# Patient Record
Sex: MALE | Race: BLACK OR AFRICAN AMERICAN | NOT HISPANIC OR LATINO
[De-identification: names, ages, dates, MRNs, and addresses within clinical notes are randomized per-mention and may not be internally consistent; named-entity substitution may affect disease eponyms.]

---

## 2017-10-08 PROBLEM — Z00.00 ENCOUNTER FOR PREVENTIVE HEALTH EXAMINATION: Status: ACTIVE | Noted: 2017-10-08

## 2017-11-07 ENCOUNTER — APPOINTMENT (OUTPATIENT)
Dept: UROLOGY | Facility: CLINIC | Age: 62
End: 2017-11-07

## 2018-11-27 ENCOUNTER — APPOINTMENT (OUTPATIENT)
Dept: NEPHROLOGY | Facility: CLINIC | Age: 63
End: 2018-11-27
Payer: COMMERCIAL

## 2018-11-27 VITALS — HEART RATE: 75 BPM | SYSTOLIC BLOOD PRESSURE: 125 MMHG | RESPIRATION RATE: 16 BRPM | DIASTOLIC BLOOD PRESSURE: 80 MMHG

## 2018-11-27 PROCEDURE — 99244 OFF/OP CNSLTJ NEW/EST MOD 40: CPT

## 2018-11-27 RX ORDER — RIVAROXABAN 20 MG/1
20 TABLET, FILM COATED ORAL DAILY
Qty: 30 | Refills: 0 | Status: ACTIVE | COMMUNITY
Start: 2018-11-27

## 2018-11-27 RX ORDER — ASPIRIN ENTERIC COATED TABLETS 81 MG 81 MG/1
81 TABLET, DELAYED RELEASE ORAL
Qty: 30 | Refills: 4 | Status: ACTIVE | COMMUNITY
Start: 2018-11-27

## 2019-04-02 ENCOUNTER — APPOINTMENT (OUTPATIENT)
Dept: NEPHROLOGY | Facility: CLINIC | Age: 64
End: 2019-04-02
Payer: COMMERCIAL

## 2019-04-02 VITALS — DIASTOLIC BLOOD PRESSURE: 82 MMHG | SYSTOLIC BLOOD PRESSURE: 130 MMHG

## 2019-04-02 VITALS — RESPIRATION RATE: 16 BRPM | SYSTOLIC BLOOD PRESSURE: 147 MMHG | DIASTOLIC BLOOD PRESSURE: 93 MMHG | HEART RATE: 81 BPM

## 2019-04-02 VITALS — DIASTOLIC BLOOD PRESSURE: 93 MMHG | SYSTOLIC BLOOD PRESSURE: 156 MMHG

## 2019-04-02 PROCEDURE — 99215 OFFICE O/P EST HI 40 MIN: CPT

## 2019-04-02 RX ORDER — METOPROLOL SUCCINATE 25 MG/1
25 TABLET, EXTENDED RELEASE ORAL DAILY
Qty: 16 | Refills: 6 | Status: ACTIVE | COMMUNITY
Start: 2019-04-02

## 2019-04-02 NOTE — ASSESSMENT
[FreeTextEntry1] : 64yo Ghanaian M with long standing HTN, prediabetes, CKD III here for f/u:\par \par # CKD III\par -  baseline Cr between 0844-7385 steady at 1.6-1.7. I reviewed march 25th labs w patient: ACR 114mcg/mg. Cr 1.96 up from 1.6 last year. Didn't get renal sono done yet - encouraged to do so. 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 \par  from oct labs, will recheck next visti. \par - egfr down to 41, if drops <40 advise decreasing xeralto to 15mg daily. Recheck in 8 weeks.\par 2016 labs reviewed Cr 1.6-1.7 so overall stable. Pt remembers hearing something about asymmetry in kidneys \par - serologic GN w/u negative aside from evidence of past Hep B infx s/p core Ab production. \par \par # HTN and Afib- well controlled.\par \par # Pre diabetes - HbA1C 5.9% Oct well controlled. repeat next visit\par \par RTC in 2 months for f/u

## 2019-04-02 NOTE — HISTORY OF PRESENT ILLNESS
[FreeTextEntry1] : 64yo Cypriot M with long standing HTN, prediabetes, CKD III here for f/u:\par \par PCP Dr. Alisha Kauffman\par Cardiologist: used to be Dr. TIFFANIE Anaya, doesn't know name of new one\par \par Getting light headedness often usually in afternoons while exerting himself or even walking. Denies spinning sensation, weakness, blurry vision or palpitations/chest pain. He then describes the light headedness as a feeling of occipital headache as well. Improves with drinking a lot of water. \par \par Some mild edema in R ankle occasionally. No Sob/orthopnea.\par Energy is improved, good appetite and stable wt\par Denies urinary sx\par Checks BP at home about twice weekly, 130/60-70s typically, HR 60-70s as well, never 50's. \par

## 2019-04-02 NOTE — PHYSICAL EXAM
[General Appearance - Alert] : alert [General Appearance - In No Acute Distress] : in no acute distress [Sclera] : the sclera and conjunctiva were normal [PERRL With Normal Accommodation] : pupils were equal in size, round, and reactive to light [Extraocular Movements] : extraocular movements were intact [Outer Ear] : the ears and nose were normal in appearance [Oropharynx] : the oropharynx was normal [Neck Appearance] : the appearance of the neck was normal [Jugular Venous Distention Increased] : there was no jugular-venous distention [Auscultation Breath Sounds / Voice Sounds] : lungs were clear to auscultation bilaterally [Murmurs] : no murmurs [Bowel Sounds] : normal bowel sounds [Abdomen Soft] : soft [Abdomen Tenderness] : non-tender [Abdomen Mass (___ Cm)] : no abdominal mass palpated [No CVA Tenderness] : no ~M costovertebral angle tenderness [No Spinal Tenderness] : no spinal tenderness [Abnormal Walk] : normal gait [Involuntary Movements] : no involuntary movements were seen [Musculoskeletal - Swelling] : no joint swelling seen [Skin Color & Pigmentation] : normal skin color and pigmentation [Skin Turgor] : normal skin turgor [] : no rash [Cranial Nerves] : cranial nerves 2-12 were intact [No Focal Deficits] : no focal deficits [Oriented To Time, Place, And Person] : oriented to person, place, and time [Impaired Insight] : insight and judgment were intact [Affect] : the affect was normal [FreeTextEntry1] : trace R ankle edema

## 2019-06-03 ENCOUNTER — FORM ENCOUNTER (OUTPATIENT)
Age: 64
End: 2019-06-03

## 2019-06-04 ENCOUNTER — OUTPATIENT (OUTPATIENT)
Dept: OUTPATIENT SERVICES | Facility: HOSPITAL | Age: 64
LOS: 1 days | End: 2019-06-04
Payer: COMMERCIAL

## 2019-06-04 ENCOUNTER — APPOINTMENT (OUTPATIENT)
Dept: ULTRASOUND IMAGING | Facility: HOSPITAL | Age: 64
End: 2019-06-04

## 2019-06-04 PROCEDURE — 76770 US EXAM ABDO BACK WALL COMP: CPT | Mod: 26,59

## 2019-06-04 PROCEDURE — 93976 VASCULAR STUDY: CPT

## 2019-06-04 PROCEDURE — 93976 VASCULAR STUDY: CPT | Mod: 26

## 2019-06-04 PROCEDURE — 76770 US EXAM ABDO BACK WALL COMP: CPT

## 2019-06-17 ENCOUNTER — CHART COPY (OUTPATIENT)
Age: 64
End: 2019-06-17

## 2019-06-18 ENCOUNTER — APPOINTMENT (OUTPATIENT)
Dept: NEPHROLOGY | Facility: CLINIC | Age: 64
End: 2019-06-18
Payer: COMMERCIAL

## 2019-06-18 VITALS — SYSTOLIC BLOOD PRESSURE: 136 MMHG | DIASTOLIC BLOOD PRESSURE: 78 MMHG | HEART RATE: 64 BPM | RESPIRATION RATE: 16 BRPM

## 2019-06-18 DIAGNOSIS — E78.5 HYPERLIPIDEMIA, UNSPECIFIED: ICD-10-CM

## 2019-06-18 PROCEDURE — 99215 OFFICE O/P EST HI 40 MIN: CPT

## 2019-06-18 NOTE — HISTORY OF PRESENT ILLNESS
[FreeTextEntry1] : 62yo [place] Islander M with long standing HTN, prediabetes, CKD III here for f/u:\par \par PCP Dr. Alisha Kauffman - next arsen sept but he's trying to get an earlier arsen \par Cardiologist: used to be Dr. TIFFANIE Anaya, doesn't currently have another cardiologist, told her office would call her\par \par Still getting light headedness often usually in afternoons while exerting himself or even walking. Denies spinning sensation, weakness, blurry vision or palpitations/chest pain. He then describes the light headedness as a feeling of occipital headache as well. Improves with drinking a lot of water. Similar to prior. \par Some mild edema in R ankle occasionally. Similar to prior. No Sob/orthopnea.\par Energy is good, good appetite and stable wt, feeling generally and strong.\par Denies urinary sx\par Checks BP at home about twice weekly, 130/60-70s typically\par \par No OTC medications\par

## 2019-06-18 NOTE — ASSESSMENT
[FreeTextEntry1] : 64yo Panamanian M with long standing HTN, prediabetes, CKD III here for f/u:\par \par # CKD III\par -  baseline Cr between 0961-4629 steady at 1.6-1.7. I reviewed march 25th labs w patient: ACR 114mcg/mg. Cr 1.96 up from 1.6 last year. \par - reviewed 6/19 sono report w pt, 8cm R mildly atrophic kidney and 11cm L with 7cm non septated simple cyst both with increased echogenicity consistent with CKD. Of note, was told by a nephrologist >10yrs ago of asymmetrical kidneys and CKD so this has been long standing. \par  from oct labs, will recheck today\par - egfr down to 41, if drops <40 advise decreasing xeralto to 15mg daily. \par - serologic GN w/u negative aside from evidence of past Hep B infx s/p core Ab production. \par \par # HTN and Afib- well controlled. Goal BP <130/80 consistently. HR was low last visit and complained of dizziness so asked him to take 12.5mg metoprolol instead of 25mg however hasn't noted difference in afternoon dizziness. Told to try taking metoprolol before bed to see if improves.Added chlorthalidone 25mg daily today for edema, trace L lung crackles on exam and bps slightly above goal. \par HLD finds statin harsh on his stomach so has been cutting in half - prescribed him 10mg as he requested so he doesn't have to cut them in half. Needs cardiology f/u\par \par # Pre diabetes - HbA1C 5.9% Oct well controlled. repeat today\par \par RTC in 4 months for f/u

## 2019-10-15 ENCOUNTER — APPOINTMENT (OUTPATIENT)
Dept: NEPHROLOGY | Facility: CLINIC | Age: 64
End: 2019-10-15
Payer: COMMERCIAL

## 2019-10-15 VITALS — DIASTOLIC BLOOD PRESSURE: 70 MMHG | SYSTOLIC BLOOD PRESSURE: 105 MMHG | RESPIRATION RATE: 16 BRPM | HEART RATE: 68 BPM

## 2019-10-15 PROCEDURE — 99215 OFFICE O/P EST HI 40 MIN: CPT

## 2019-10-15 NOTE — HISTORY OF PRESENT ILLNESS
[FreeTextEntry1] : 65yo Sammarinese M with long standing HTN, prediabetes, CKD III here for f/u:\par \par PCP Dr. Alisha Kauffman - next arsen sept but he's trying to get an earlier arsen \par Cardiologist: used to be Dr. TIFFANIE Anaya, doesn't currently have another cardiologist, told her office would call her\par \par Checking BP at home, usually 120s/80-90 , HR 60's-70s\par Notes R leg swelling for some time now, unchanged. No prior surgery or injury to the RLE. \par no HURST or orthopnea.\par Saw PCP and cardiologist since last visit, no changes made. Due for colonoscopy. \par Occasional light headedness started in the last month or two on standing\par \par No OTC medications\par

## 2019-10-15 NOTE — ASSESSMENT
[FreeTextEntry1] : 64yo Saudi Arabian M with long standing HTN, prediabetes, CKD III here for f/u:\par \par # CKD III\par -  baseline Cr between 5157-5590 steady at 1.6-1.7. March/19 Cr up to 1.96, reviewed Oct labs, Cr stable 1.8. Negligible albuminuria.  \par - reviewed 6/19 sono report w pt, 8cm R mildly atrophic kidney and 11cm L with 7cm non septated simple cyst both with increased echogenicity consistent with CKD. Of note, was told by a nephrologist >10yrs ago of asymmetrical kidneys and CKD so this has been long standing. \par - egfr 40s, if drops <40 advise decreasing xeralto to 15mg daily. \par - serologic GN w/u negative aside from evidence of past Hep B infx s/p core Ab production. \par \par # HTN and Afib- well controlled. Goal BP <130/80 consistently. Low today in office, asked him to check BP at home 3x in a row to ensure valid and to bring machine to his pcp next month to compare to their readings. BUN up to 33 from 31, lightheadedness occasionally, if bp at home <120/80 consistently, he's to take only 1/2 chlorthalidone daily (12.5mg). \par \par # Pre diabetes - HbA1C 6%% Oct well controlled\par \par RTC in 4 months for f/u

## 2019-10-15 NOTE — PHYSICAL EXAM
[General Appearance - Alert] : alert [Sclera] : the sclera and conjunctiva were normal [General Appearance - In No Acute Distress] : in no acute distress [PERRL With Normal Accommodation] : pupils were equal in size, round, and reactive to light [Extraocular Movements] : extraocular movements were intact [Outer Ear] : the ears and nose were normal in appearance [Oropharynx] : the oropharynx was normal [Jugular Venous Distention Increased] : there was no jugular-venous distention [Neck Appearance] : the appearance of the neck was normal [Auscultation Breath Sounds / Voice Sounds] : lungs were clear to auscultation bilaterally [Murmurs] : no murmurs [Bowel Sounds] : normal bowel sounds [Abdomen Soft] : soft [Abdomen Tenderness] : non-tender [No CVA Tenderness] : no ~M costovertebral angle tenderness [Abdomen Mass (___ Cm)] : no abdominal mass palpated [Abnormal Walk] : normal gait [No Spinal Tenderness] : no spinal tenderness [Musculoskeletal - Swelling] : no joint swelling seen [Involuntary Movements] : no involuntary movements were seen [Skin Turgor] : normal skin turgor [Skin Color & Pigmentation] : normal skin color and pigmentation [] : no rash [No Focal Deficits] : no focal deficits [Cranial Nerves] : cranial nerves 2-12 were intact [Impaired Insight] : insight and judgment were intact [Oriented To Time, Place, And Person] : oriented to person, place, and time [Affect] : the affect was normal [FreeTextEntry1] : trace R ankle edema

## 2020-02-04 ENCOUNTER — APPOINTMENT (OUTPATIENT)
Dept: NEPHROLOGY | Facility: CLINIC | Age: 65
End: 2020-02-04
Payer: COMMERCIAL

## 2020-02-04 VITALS — HEART RATE: 65 BPM | SYSTOLIC BLOOD PRESSURE: 124 MMHG | DIASTOLIC BLOOD PRESSURE: 80 MMHG | RESPIRATION RATE: 16 BRPM

## 2020-02-04 PROCEDURE — 99214 OFFICE O/P EST MOD 30 MIN: CPT

## 2020-02-04 RX ORDER — ERGOCALCIFEROL 1.25 MG/1
1.25 MG CAPSULE ORAL
Qty: 12 | Refills: 4 | Status: DISCONTINUED | COMMUNITY
Start: 2019-04-02 | End: 2020-02-04

## 2020-02-10 ENCOUNTER — APPOINTMENT (OUTPATIENT)
Dept: NEPHROLOGY | Facility: CLINIC | Age: 65
End: 2020-02-10
Payer: COMMERCIAL

## 2020-02-10 VITALS — HEART RATE: 71 BPM | DIASTOLIC BLOOD PRESSURE: 89 MMHG | SYSTOLIC BLOOD PRESSURE: 129 MMHG

## 2020-02-10 PROCEDURE — 93784 AMBL BP MNTR W/SOFTWARE: CPT

## 2020-02-10 NOTE — PROCEDURE
[FreeTextEntry1] : Placed ABPM on left arm. Gave instructions for device function and proper fit. sleep period: 10p-7a.\par initial /89\par

## 2020-03-16 NOTE — ASSESSMENT
[FreeTextEntry1] : 65yo Fijian M with long standing HTN, prediabetes, CKD III here for f/u:\par \par # CKD III\par -  baseline Cr between 2258-5988 steady at 1.6-1.7. March and Oct/19 Cr 1.96, 1.8, decreased thiazide to 12.5mg daily and reviewed Jan labs Cr back to baseline 1.6.  PCR 0.2g \par - reviewed 6/19 sono report w pt, 8cm R mildly atrophic kidney and 11cm L with 7cm non septated simple cyst both with increased echogenicity consistent with CKD. Of note, was told by a nephrologist >10yrs ago of asymmetrical kidneys and CKD so this has been long standing. \par - egfr 50s, if drops <40 advise decreasing xeralto to 15mg daily. \par - serologic GN w/u negative aside from evidence of past Hep B infx s/p core Ab production. \par \par # HTN and Afib- controlled here in office however at home he's often getting high diastolics, will return Monday for 24hr ABPM placement. Goal BP <130/80 consistently.\par \par # Pre diabetes - HbA1C 6%% Oct well controlled\par \par RTC in 6 months for f/u\par \par Addendum: reviewed 24hr ABPM results - at goal, high diastolics on home BP monitor not consistent on 24hr, likely user error/white coat HTN. Non-dipper. No change in management.

## 2020-03-16 NOTE — HISTORY OF PRESENT ILLNESS
[FreeTextEntry1] : 65yo Micronesian M with long standing HTN, prediabetes, CKD III here for f/u:\par \par PCP Dr. Alisha Kauffman - next arsen sept but he's trying to get an earlier arsen \par Cardiologist: new, Dr Aliyah Lockhart, saw recently, no changes made\par \par Doing well, had a cold in January sx resolved now. \par BP 130s/ at home lately. \par Notes R leg swelling for some time now, unchanged. No prior surgery or injury to the RLE. \par no HURST or orthopnea.\par No further light headedness\par No urinary sx\par No OTC medications\par

## 2020-03-16 NOTE — PHYSICAL EXAM
[General Appearance - In No Acute Distress] : in no acute distress [General Appearance - Alert] : alert [PERRL With Normal Accommodation] : pupils were equal in size, round, and reactive to light [Sclera] : the sclera and conjunctiva were normal [Extraocular Movements] : extraocular movements were intact [Outer Ear] : the ears and nose were normal in appearance [Oropharynx] : the oropharynx was normal [Neck Appearance] : the appearance of the neck was normal [Jugular Venous Distention Increased] : there was no jugular-venous distention [Murmurs] : no murmurs [Auscultation Breath Sounds / Voice Sounds] : lungs were clear to auscultation bilaterally [Bowel Sounds] : normal bowel sounds [Abdomen Tenderness] : non-tender [Abdomen Soft] : soft [Abdomen Mass (___ Cm)] : no abdominal mass palpated [No CVA Tenderness] : no ~M costovertebral angle tenderness [Musculoskeletal - Swelling] : no joint swelling seen [Involuntary Movements] : no involuntary movements were seen [No Spinal Tenderness] : no spinal tenderness [Abnormal Walk] : normal gait [] : no rash [Skin Color & Pigmentation] : normal skin color and pigmentation [Skin Turgor] : normal skin turgor [Oriented To Time, Place, And Person] : oriented to person, place, and time [Cranial Nerves] : cranial nerves 2-12 were intact [No Focal Deficits] : no focal deficits [Affect] : the affect was normal [Impaired Insight] : insight and judgment were intact [FreeTextEntry1] : trace R ankle edema

## 2020-06-09 ENCOUNTER — APPOINTMENT (OUTPATIENT)
Dept: NEPHROLOGY | Facility: CLINIC | Age: 65
End: 2020-06-09
Payer: COMMERCIAL

## 2020-06-09 VITALS
WEIGHT: 163 LBS | HEIGHT: 62 IN | SYSTOLIC BLOOD PRESSURE: 126 MMHG | DIASTOLIC BLOOD PRESSURE: 88 MMHG | HEART RATE: 72 BPM | BODY MASS INDEX: 30 KG/M2

## 2020-06-09 DIAGNOSIS — Z78.9 OTHER SPECIFIED HEALTH STATUS: ICD-10-CM

## 2020-06-09 PROCEDURE — 99214 OFFICE O/P EST MOD 30 MIN: CPT

## 2020-06-09 NOTE — HISTORY OF PRESENT ILLNESS
[FreeTextEntry1] : 64-year-old man with a longstanding history of hypertension, CKD 3, prediabetes, nonnephrotic proteinuria, atrial fibrillation.  His creatinine was stable at 1.6-1.7 from 8978-6580, then increased to the range of 1.8-2.0, but is now down to 1.5!  He checks his home BP daily and runs 125-130/88-90.  He is faithfully taking amlodipine 5 mg daily chlorthalidone 25 mg daily and metoprolol 25 mg daily.  He has no edema, and no history of gout.  He continues to work as a  at [employer].

## 2020-06-09 NOTE — PHYSICAL EXAM
[General Appearance - In No Acute Distress] : in no acute distress [General Appearance - Alert] : alert [Sclera] : the sclera and conjunctiva were normal [Outer Ear] : the ears and nose were normal in appearance [PERRL With Normal Accommodation] : pupils were equal in size, round, and reactive to light [Neck Cervical Mass (___cm)] : no neck mass was observed [Neck Appearance] : the appearance of the neck was normal [Jugular Venous Distention Increased] : there was no jugular-venous distention [Heart Rate And Rhythm] : heart rate was normal and rhythm regular [Auscultation Breath Sounds / Voice Sounds] : lungs were clear to auscultation bilaterally [Heart Sounds] : normal S1 and S2 [Heart Sounds Gallop] : no gallops [Full Pulse] : the pedal pulses are present [Heart Sounds Pericardial Friction Rub] : no pericardial rub [Murmurs] : no murmurs [Edema] : there was no peripheral edema [No CVA Tenderness] : no ~M costovertebral angle tenderness [No Spinal Tenderness] : no spinal tenderness [Nail Clubbing] : no clubbing  or cyanosis of the fingernails [Abnormal Walk] : normal gait [Motor Tone] : muscle strength and tone were normal [Musculoskeletal - Swelling] : no joint swelling seen [Skin Turgor] : normal skin turgor [Skin Color & Pigmentation] : normal skin color and pigmentation [] : no rash [Sensation] : the sensory exam was normal to light touch and pinprick [Deep Tendon Reflexes (DTR)] : deep tendon reflexes were 2+ and symmetric [No Focal Deficits] : no focal deficits [Oriented To Time, Place, And Person] : oriented to person, place, and time [Impaired Insight] : insight and judgment were intact [Affect] : the affect was normal

## 2020-06-09 NOTE — ASSESSMENT
[FreeTextEntry1] : 64-year-old man with stable CKD 3, adequate BP control, tolerating antihypertensive meds well, avoiding NSAIDs.  We will continue current regimen and see Dr. Dye in 4 months.  He was given labs to be done a week before the next visit

## 2020-07-29 ENCOUNTER — RX RENEWAL (OUTPATIENT)
Age: 65
End: 2020-07-29

## 2020-07-29 RX ORDER — CHLORTHALIDONE 25 MG/1
25 TABLET ORAL DAILY
Qty: 90 | Refills: 3 | Status: ACTIVE | COMMUNITY
Start: 2019-06-18 | End: 1900-01-01

## 2020-08-11 ENCOUNTER — APPOINTMENT (OUTPATIENT)
Dept: HEART AND VASCULAR | Facility: CLINIC | Age: 65
End: 2020-08-11

## 2020-09-15 ENCOUNTER — APPOINTMENT (OUTPATIENT)
Dept: NEPHROLOGY | Facility: CLINIC | Age: 65
End: 2020-09-15
Payer: COMMERCIAL

## 2020-09-15 VITALS — HEART RATE: 68 BPM | SYSTOLIC BLOOD PRESSURE: 136 MMHG | RESPIRATION RATE: 16 BRPM | DIASTOLIC BLOOD PRESSURE: 77 MMHG

## 2020-09-15 VITALS — SYSTOLIC BLOOD PRESSURE: 121 MMHG | DIASTOLIC BLOOD PRESSURE: 74 MMHG

## 2020-09-15 PROCEDURE — 99214 OFFICE O/P EST MOD 30 MIN: CPT

## 2020-09-15 RX ORDER — ROSUVASTATIN CALCIUM 20 MG/1
20 TABLET, FILM COATED ORAL
Qty: 90 | Refills: 0 | Status: ACTIVE | COMMUNITY
Start: 2019-04-02

## 2020-09-15 NOTE — HISTORY OF PRESENT ILLNESS
[FreeTextEntry1] : 64yo Vietnamese M with Afib on AC, long standing HTN, prediabetes, CKD III here for f/u:\par \par PCP Dr. Alisha Kauffman - next arsen sept but he's trying to get an earlier arsen \par Cardiologist: Dr Aliyah Lockhart\par \par Doing well since last visit with my colleagues in Feb and March of this year. \par Had a L knee injury a few months ago, went away with 5d of meloxicam. \par Saw cardiologist for cardiac MRI, told it was "ok" but needs to see Dr. Lockhart to further discuss results. He was told of an issue with needing to "reset" the heart, ?arrhythmia\par Chronic mild R leg swelling, stable compared to prior. Worse when sitting, no pain on walking. No prior surgery or injury to the RLE. \par \par

## 2020-09-15 NOTE — PHYSICAL EXAM
[General Appearance - Alert] : alert [Sclera] : the sclera and conjunctiva were normal [PERRL With Normal Accommodation] : pupils were equal in size, round, and reactive to light [General Appearance - In No Acute Distress] : in no acute distress [Extraocular Movements] : extraocular movements were intact [Outer Ear] : the ears and nose were normal in appearance [Neck Appearance] : the appearance of the neck was normal [Oropharynx] : the oropharynx was normal [Jugular Venous Distention Increased] : there was no jugular-venous distention [Auscultation Breath Sounds / Voice Sounds] : lungs were clear to auscultation bilaterally [Murmurs] : no murmurs [Bowel Sounds] : normal bowel sounds [Abdomen Soft] : soft [Abdomen Tenderness] : non-tender [Abdomen Mass (___ Cm)] : no abdominal mass palpated [No CVA Tenderness] : no ~M costovertebral angle tenderness [No Spinal Tenderness] : no spinal tenderness [Abnormal Walk] : normal gait [Involuntary Movements] : no involuntary movements were seen [Musculoskeletal - Swelling] : no joint swelling seen [Skin Turgor] : normal skin turgor [] : no rash [Skin Color & Pigmentation] : normal skin color and pigmentation [Cranial Nerves] : cranial nerves 2-12 were intact [No Focal Deficits] : no focal deficits [Oriented To Time, Place, And Person] : oriented to person, place, and time [Affect] : the affect was normal [Impaired Insight] : insight and judgment were intact [FreeTextEntry1] : trace R ankle edema

## 2020-09-15 NOTE — ASSESSMENT
[FreeTextEntry1] : 66yo Urugu[place] M with Afib on AC, long standing HTN, prediabetes, CKD III here for f/u:\par \par # CKD III\par -  baseline Cr between 3453-1015 steady at 1.6-1.7. Reviewed Aug labs - Cr stable 1.54. Ernest u/a \par - 6/19 sono 8cm R mildly atrophic kidney and 11cm L with 7cm non septated simple cyst both with increased echogenicity consistent with CKD. Of note, was told by a nephrologist >10yrs ago of asymmetrical kidneys and CKD so this has been long standing. \par - egfr 50s, if drops <40 advise decreasing xeralto to 15mg daily. \par - serologic GN w/u negative aside from evidence of past Hep B infx s/p core Ab production. \par \par # HTN and Afib- controlled here in office however at home he's still often getting high diastolics. Last year we'd done 24hr ABPM results showing his BP was at goal including diastolics. Non-dipper. Asked him to bring his BP monitor in to his upcoming arsen with Dr Lockhart so he can check it in front of him to ensure proper technique and compare results to office BP machine. \par \par # Pre diabetes - well controlled\par \par RTC in 6 months for f/u\par \par

## 2020-09-22 ENCOUNTER — APPOINTMENT (OUTPATIENT)
Dept: GASTROENTEROLOGY | Facility: CLINIC | Age: 65
End: 2020-09-22
Payer: COMMERCIAL

## 2020-09-22 VITALS
RESPIRATION RATE: 16 BRPM | WEIGHT: 161.6 LBS | HEIGHT: 62 IN | OXYGEN SATURATION: 97 % | SYSTOLIC BLOOD PRESSURE: 120 MMHG | DIASTOLIC BLOOD PRESSURE: 80 MMHG | HEART RATE: 60 BPM | TEMPERATURE: 97.6 F | BODY MASS INDEX: 29.74 KG/M2

## 2020-09-22 DIAGNOSIS — Z12.12 ENCOUNTER FOR SCREENING FOR MALIGNANT NEOPLASM OF COLON: ICD-10-CM

## 2020-09-22 DIAGNOSIS — Z12.11 ENCOUNTER FOR SCREENING FOR MALIGNANT NEOPLASM OF COLON: ICD-10-CM

## 2020-09-22 PROCEDURE — 99203 OFFICE O/P NEW LOW 30 MIN: CPT

## 2020-09-22 NOTE — ASSESSMENT
[FreeTextEntry1] : 66yo M presenting for surveillance colonoscopy\par \par 1. Colorectal cancer screening - Patient average risk for CRC without family history. Initial screening colon unremarkable at age 49, repeat around age 59 with polyp and instructed to follow-up in 5 years.\par - Obtain routine labs\par - Obtain records from [facility]\par - Instructed patient to obtain cardiac evaluation/clearance for procedure and anesthesia given cardiac comorbidities - request EKG, ECHO, MRI findings\par - Patient will need to be off of Xarelto for 3 days prior to procedure given renal function\par - Discussed r/b/a/i of colonoscopic evaluation and patient amenable\par - Discussed need for bowel prep, COVID swab, and escort\par - Given renal fx, will plan for GoLytely bowel prep\par \par RTC as needed\par Patient seen and discussed with attending physician

## 2020-09-22 NOTE — PHYSICAL EXAM
[General Appearance - Alert] : alert [General Appearance - In No Acute Distress] : in no acute distress [General Appearance - Well Nourished] : well nourished [General Appearance - Well Developed] : well developed [General Appearance - Well-Appearing] : healthy appearing [PERRL With Normal Accommodation] : pupils were equal in size, round, and reactive to light [Extraocular Movements] : extraocular movements were intact [Outer Ear] : the ears and nose were normal in appearance [Hearing Threshold Finger Rub Not [place]] : hearing was normal [Neck Cervical Mass (___cm)] : no neck mass was observed [Respiration, Rhythm And Depth] : normal respiratory rhythm and effort [Exaggerated Use Of Accessory Muscles For Inspiration] : no accessory muscle use [Heart Rate And Rhythm] : heart rate was normal and rhythm regular [Edema] : there was no peripheral edema [Veins - Varicosity Changes] : there were no varicosital changes [Bowel Sounds] : normal bowel sounds [Abdomen Soft] : soft [Involuntary Movements] : no involuntary movements were seen [Skin Color & Pigmentation] : normal skin color and pigmentation [Skin Turgor] : normal skin turgor [] : no rash [Skin Lesions] : no skin lesions [No Focal Deficits] : no focal deficits [Oriented To Time, Place, And Person] : oriented to person, place, and time [Impaired Insight] : insight and judgment were intact [Affect] : the affect was normal [Mood] : the mood was normal

## 2020-09-22 NOTE — PHYSICAL EXAM
[General Appearance - Alert] : alert [General Appearance - In No Acute Distress] : in no acute distress [General Appearance - Well Nourished] : well nourished [General Appearance - Well Developed] : well developed [General Appearance - Well-Appearing] : healthy appearing [PERRL With Normal Accommodation] : pupils were equal in size, round, and reactive to light [Extraocular Movements] : extraocular movements were intact [Outer Ear] : the ears and nose were normal in appearance [Hearing Threshold Finger Rub Not [place]] : hearing was normal [Neck Cervical Mass (___cm)] : no neck mass was observed [Respiration, Rhythm And Depth] : normal respiratory rhythm and effort [Exaggerated Use Of Accessory Muscles For Inspiration] : no accessory muscle use [Heart Rate And Rhythm] : heart rate was normal and rhythm regular [Edema] : there was no peripheral edema [Veins - Varicosity Changes] : there were no varicosital changes [Bowel Sounds] : normal bowel sounds [Abdomen Soft] : soft [Involuntary Movements] : no involuntary movements were seen [Skin Color & Pigmentation] : normal skin color and pigmentation [Skin Turgor] : normal skin turgor [] : no rash [Skin Lesions] : no skin lesions [No Focal Deficits] : no focal deficits [Oriented To Time, Place, And Person] : oriented to person, place, and time [Impaired Insight] : insight and judgment were intact [Affect] : the affect was normal [Mood] : the mood was normal

## 2020-09-22 NOTE — ASSESSMENT
[FreeTextEntry1] : 66yo M presenting for surveillance colonoscopy\par \par 1. Colorectal cancer screening - Patient average risk for CRC without family history. Initial screening colon unremarkable at age 49, repeat around age 59 with polyp and instructed to follow-up in 5 years.\par - Obtain routine labs\par - Obtain records from [facility]\par - Instructed patient to obtain cardiac evaluation/clearance for procedure and anesthesia given cardiac comorbidities - request EKG, ECHO, MRI findings\par - Patient will need to be off of Xarelto for 3 days prior to procedure given renal function\par - Discussed r/b/a/i of colonoscopic evaluation and patient amenable\par - Discussed need for bowel prep, COVID swab, and escort\par - Given renal fx, will plan for GoLytely bowel prep\par \par RTC as needed\par Patient seen and discussed with attending physician

## 2020-09-22 NOTE — HISTORY OF PRESENT ILLNESS
PST / PCP [de-identified] : 65yM with Hx of CVA, A fib, CKDIII, HCM, HLD, HTN, PreDM who presents for surveillance colonoscopy. Patient reports being in his usual state of health. Underwent screening colonoscopy around age 49, then at 59 at [facility]. He was told there were polyps and was instructed to follow-up in 5 years. No personal or family history of GI malignancy, no change in bowel habits, stool color, quality, or consistency. Patient follows with Dr. Lockhart as his Cardiologist. Currently on Xarelto for A fib, and being monitored for HCM. Some concern for cardiac Amyloid given recent echocardiogram findings, for which he underwent further testing and an MRI, the results of which the patient does not have available.

## 2020-09-22 NOTE — HISTORY OF PRESENT ILLNESS
[de-identified] : 65yM with Hx of CVA, A fib, CKDIII, HCM, HLD, HTN, PreDM who presents for surveillance colonoscopy. Patient reports being in his usual state of health. Underwent screening colonoscopy around age 49, then at 59 at [facility]. He was told there were polyps and was instructed to follow-up in 5 years. No personal or family history of GI malignancy, no change in bowel habits, stool color, quality, or consistency. Patient follows with Dr. Lockhart as his Cardiologist. Currently on Xarelto for A fib, and being monitored for HCM. Some concern for cardiac Amyloid given recent echocardiogram findings, for which he underwent further testing and an MRI, the results of which the patient does not have available.

## 2020-09-23 LAB
ALBUMIN SERPL ELPH-MCNC: 4.4 G/DL
ALP BLD-CCNC: 70 U/L
ALT SERPL-CCNC: 38 U/L
ANION GAP SERPL CALC-SCNC: 12 MMOL/L
AST SERPL-CCNC: 36 U/L
BASOPHILS # BLD AUTO: 0.04 K/UL
BASOPHILS NFR BLD AUTO: 0.7 %
BILIRUB SERPL-MCNC: 0.9 MG/DL
BUN SERPL-MCNC: 24 MG/DL
CALCIUM SERPL-MCNC: 9.3 MG/DL
CHLORIDE SERPL-SCNC: 102 MMOL/L
CO2 SERPL-SCNC: 30 MMOL/L
CREAT SERPL-MCNC: 1.73 MG/DL
EOSINOPHIL # BLD AUTO: 0.16 K/UL
EOSINOPHIL NFR BLD AUTO: 2.9 %
GLUCOSE SERPL-MCNC: 87 MG/DL
HCT VFR BLD CALC: 52 %
HGB BLD-MCNC: 16.7 G/DL
IMM GRANULOCYTES NFR BLD AUTO: 0.2 %
INR PPP: 1.32 RATIO
LYMPHOCYTES # BLD AUTO: 2.33 K/UL
LYMPHOCYTES NFR BLD AUTO: 42.4 %
MAN DIFF?: NORMAL
MCHC RBC-ENTMCNC: 30.2 PG
MCHC RBC-ENTMCNC: 32.1 GM/DL
MCV RBC AUTO: 94 FL
MONOCYTES # BLD AUTO: 0.46 K/UL
MONOCYTES NFR BLD AUTO: 8.4 %
NEUTROPHILS # BLD AUTO: 2.49 K/UL
NEUTROPHILS NFR BLD AUTO: 45.4 %
PLATELET # BLD AUTO: 138 K/UL
POTASSIUM SERPL-SCNC: 3.8 MMOL/L
PROT SERPL-MCNC: 6.8 G/DL
PT BLD: 15.4 SEC
RBC # BLD: 5.53 M/UL
RBC # FLD: 13.4 %
SODIUM SERPL-SCNC: 143 MMOL/L
WBC # FLD AUTO: 5.49 K/UL

## 2020-12-01 ENCOUNTER — LABORATORY RESULT (OUTPATIENT)
Age: 65
End: 2020-12-01

## 2020-12-05 ENCOUNTER — LABORATORY RESULT (OUTPATIENT)
Age: 65
End: 2020-12-05

## 2020-12-08 ENCOUNTER — OUTPATIENT (OUTPATIENT)
Dept: OUTPATIENT SERVICES | Facility: HOSPITAL | Age: 65
LOS: 1 days | Discharge: HOME | End: 2020-12-08
Payer: COMMERCIAL

## 2020-12-08 ENCOUNTER — RESULT REVIEW (OUTPATIENT)
Age: 65
End: 2020-12-08

## 2020-12-08 ENCOUNTER — APPOINTMENT (OUTPATIENT)
Dept: GASTROENTEROLOGY | Facility: HOSPITAL | Age: 65
End: 2020-12-08

## 2020-12-08 PROCEDURE — 88305 TISSUE EXAM BY PATHOLOGIST: CPT

## 2020-12-08 PROCEDURE — 45385 COLONOSCOPY W/LESION REMOVAL: CPT

## 2020-12-08 PROCEDURE — 45385 COLONOSCOPY W/LESION REMOVAL: CPT | Mod: PT

## 2020-12-08 PROCEDURE — 88305 TISSUE EXAM BY PATHOLOGIST: CPT | Mod: 26

## 2020-12-09 LAB — SURGICAL PATHOLOGY STUDY: SIGNIFICANT CHANGE UP

## 2020-12-10 DIAGNOSIS — Z86.010 PERSONAL HISTORY OF COLONIC POLYPS: ICD-10-CM

## 2020-12-10 DIAGNOSIS — R73.03 PREDIABETES: ICD-10-CM

## 2020-12-10 DIAGNOSIS — I10 ESSENTIAL (PRIMARY) HYPERTENSION: ICD-10-CM

## 2020-12-10 DIAGNOSIS — Z12.11 ENCOUNTER FOR SCREENING FOR MALIGNANT NEOPLASM OF COLON: ICD-10-CM

## 2020-12-10 DIAGNOSIS — I48.91 UNSPECIFIED ATRIAL FIBRILLATION: ICD-10-CM

## 2020-12-10 DIAGNOSIS — K64.8 OTHER HEMORRHOIDS: ICD-10-CM

## 2020-12-10 DIAGNOSIS — D12.4 BENIGN NEOPLASM OF DESCENDING COLON: ICD-10-CM

## 2021-03-16 ENCOUNTER — APPOINTMENT (OUTPATIENT)
Dept: NEPHROLOGY | Facility: CLINIC | Age: 66
End: 2021-03-16
Payer: COMMERCIAL

## 2021-03-16 PROCEDURE — 99443: CPT

## 2021-03-16 NOTE — PHYSICAL EXAM
[General Appearance - Alert] : alert [General Appearance - In No Acute Distress] : in no acute distress [Oriented To Time, Place, And Person] : oriented to person, place, and time [Impaired Insight] : insight and judgment were intact

## 2021-03-16 NOTE — ASSESSMENT
[FreeTextEntry1] : 64yo [place] M with Afib on AC, long standing HTN, prediabetes, CKD III here for f/u via tele visit:\par \par # CKD III\par -  baseline Cr between 3253-0017 steady at 1.6-1.7. [place] u/a. Likely 2/2 HTN. Check labs today. \par - 6/19 sono 8cm R mildly atrophic kidney and 11cm L with 7cm non septated simple cyst both with increased echogenicity consistent with CKD. Of note, was told by a nephrologist >10yrs ago of asymmetrical kidneys and CKD so this has been long standing. \par - egfr 50s, if drops <40 advise decreasing xeralto to 15mg daily. \par - serologic GN w/u negative aside from evidence of past Hep B infx s/p core Ab production. \par \par # HTN and Afib- controlled here in office however at home he's still often getting high diastolics. Last year we'd done 24hr ABPM results showing his BP was at goal including diastolics. Non-dipper. He's having some mild dizziness that occurs a few hours after he takes all his meds in the morning. Asked him to check his BP and HR for the next few days DURING the dizziness to see if related to drop in BP or HR. May need to switch amlodipine to qHS, or stop Metoprolol and have him f/u with his cardiologist earlier than May. No alarm sx, self resolves. Hasn't seen his cardiologist in >1yr due to covid. No edema or chest pain. \par \par # Pre diabetes - well controlled\par \par RTC in 6 months for f/u\par \par

## 2021-03-16 NOTE — HISTORY OF PRESENT ILLNESS
[FreeTextEntry1] : 66yo [place] M with Afib on AC, long standing HTN, prediabetes, CKD III here for f/u via tele visit:\par \par PCP Dr. Alisha Kauffman - next arsen sept but he's trying to get an earlier arsen \par Cardiologist: Dr Aliyah Lockhart\par \par Pfizer vaccinated x2. No s/e. \par Still keeping socially distant and wearing mask. \par \par /89, diastolics almost always 89, Systolic used to be 130s, now more 140s. \par Wt is stable. No dietary changes. Gets occasional headache for which he'll take tylenol. No SOB. \par Almost daily dizziness 2-3 hours after he takes his 3 antihypertensives,  that improves with water drinking. \par \par \par

## 2021-07-27 ENCOUNTER — NON-APPOINTMENT (OUTPATIENT)
Age: 66
End: 2021-07-27

## 2021-07-27 ENCOUNTER — APPOINTMENT (OUTPATIENT)
Dept: HEART AND VASCULAR | Facility: CLINIC | Age: 66
End: 2021-07-27
Payer: COMMERCIAL

## 2021-07-27 VITALS
WEIGHT: 164 LBS | TEMPERATURE: 96.2 F | BODY MASS INDEX: 30.18 KG/M2 | SYSTOLIC BLOOD PRESSURE: 141 MMHG | HEIGHT: 62 IN | OXYGEN SATURATION: 96 % | DIASTOLIC BLOOD PRESSURE: 88 MMHG | HEART RATE: 64 BPM

## 2021-07-27 PROCEDURE — 99204 OFFICE O/P NEW MOD 45 MIN: CPT

## 2021-07-27 PROCEDURE — 99072 ADDL SUPL MATRL&STAF TM PHE: CPT

## 2021-07-27 PROCEDURE — 93000 ELECTROCARDIOGRAM COMPLETE: CPT

## 2021-07-27 RX ORDER — AMLODIPINE BESYLATE 5 MG/1
5 TABLET ORAL
Refills: 0 | Status: ACTIVE | COMMUNITY
Start: 2018-11-27

## 2021-07-27 NOTE — DISCUSSION/SUMMARY
[FreeTextEntry1] : Mr. Betancur is a pleasant 65 year-old gentleman with a past medical history significant for CVA, apical HCM, HLD, HTN, Pre-DM, CKD, Obesity and persistent atrial fibrillation.  We discussed the natural history of atrial fibrillation and options for management of his arrhythmia.  We will proceed with an elective cardioversion, which we discussed is not a curative measure and I am unsure if he will maintain sinus rhythm post procedure.  We can consider the use of AAD therapy pending clinical progress.  In terms of his HCM, I have recommended a repeat cardiac MRI in the coming months, in particular to assess for LV apical aneurysm given findings on echo.  No medication changes were recommended today.  He was asked to return for follow-up in one month and call with any questions or concerns in the interim.

## 2021-07-27 NOTE — PHYSICAL EXAM
[Well Developed] : well developed [Well Nourished] : well nourished [No Acute Distress] : no acute distress [Normal Conjunctiva] : normal conjunctiva [Normal Venous Pressure] : normal venous pressure [No Carotid Bruit] : no carotid bruit [Normal S1, S2] : normal S1, S2 [No Murmur] : no murmur [No Rub] : no rub [No Gallop] : no gallop [5th Left ICS - MCL] : palpated at the 5th LICS in the midclavicular line [Normal Rate] : normal [Irregularly Irregular] : irregularly irregular [Clear Lung Fields] : clear lung fields [Good Air Entry] : good air entry [No Respiratory Distress] : no respiratory distress  [Soft] : abdomen soft [Non Tender] : non-tender [No Masses/organomegaly] : no masses/organomegaly [Normal Bowel Sounds] : normal bowel sounds [No Edema] : no edema [Normal Gait] : normal gait [No Cyanosis] : no cyanosis [No Clubbing] : no clubbing [No Varicosities] : no varicosities [No Rash] : no rash [No Skin Lesions] : no skin lesions [Moves all extremities] : moves all extremities [No Focal Deficits] : no focal deficits [Normal Speech] : normal speech [Alert and Oriented] : alert and oriented [Normal memory] : normal memory

## 2021-07-27 NOTE — CARDIOLOGY SUMMARY
[de-identified] : 7/27/21 AFib @ 60 bpm [de-identified] : Holter 6/22/20 Atrial fibrillation 42-, rare PVCs\par Holter 10/5/18: Atrial fibrillation / flutter with HR range 45- [de-identified] : 6/1/20 Apical variant LV hypertrophy pattern extending from mid ventricle to apex, LVEF 60-65%, apical RV hypertrophy, severe LA enlargement, mild MR, mod-severe TR with dilated RA/ atrial appendage, mildly dilated prox ascending aorta 3.6 cm [de-identified] : CMR 8/31/20 Apical variant hypertrphic cardiomyopathy with increased LV mass, LVEF 85% with apical diastolic dysfunction and regional apical distal hypokinesis secondary to microvascular ischemia/scar, MR with dilated LA/TEDDY appendage, no IAN, TR

## 2021-07-27 NOTE — HISTORY OF PRESENT ILLNESS
[FreeTextEntry1] : Mr. Betancur is a pleasant 65 year-old gentleman with a past medical history significant for CVA, apical HCM, HLD, HTN, Pre-DM, CKD, Obesity and atrial fibrillation who presents for initial evaluation.\par \par He reports he was first told he had atrial fibrillation in 2017.  He denies history of rhythm management including DCCV, AAD therapy or catheter based ablation.  He does endorse feeling "tired all the time" but no worsening HURST, CP, palpitations, presyncope or syncope.  He is compliant with Xarelto without interruption.\par \par Kappa/lambda light chains, SPEP, UPEP ordered by Dr. Lockhart; results not available.

## 2021-08-10 ENCOUNTER — OUTPATIENT (OUTPATIENT)
Dept: OUTPATIENT SERVICES | Facility: HOSPITAL | Age: 66
LOS: 1 days | Discharge: HOME | End: 2021-08-10
Payer: COMMERCIAL

## 2021-08-10 LAB — SARS-COV-2 N GENE NPH QL NAA+PROBE: NOT DETECTED

## 2021-08-10 PROCEDURE — 92960 CARDIOVERSION ELECTRIC EXT: CPT

## 2021-09-21 ENCOUNTER — APPOINTMENT (OUTPATIENT)
Dept: NEPHROLOGY | Facility: CLINIC | Age: 66
End: 2021-09-21
Payer: COMMERCIAL

## 2021-09-21 VITALS — DIASTOLIC BLOOD PRESSURE: 78 MMHG | SYSTOLIC BLOOD PRESSURE: 133 MMHG

## 2021-09-21 VITALS
TEMPERATURE: 98.2 F | HEART RATE: 76 BPM | BODY MASS INDEX: 29.45 KG/M2 | OXYGEN SATURATION: 99 % | WEIGHT: 161 LBS | DIASTOLIC BLOOD PRESSURE: 89 MMHG | SYSTOLIC BLOOD PRESSURE: 144 MMHG

## 2021-09-21 VITALS — DIASTOLIC BLOOD PRESSURE: 89 MMHG | SYSTOLIC BLOOD PRESSURE: 128 MMHG

## 2021-09-21 DIAGNOSIS — N18.30 CHRONIC KIDNEY DISEASE, STAGE 3 UNSPECIFIED: ICD-10-CM

## 2021-09-21 DIAGNOSIS — N26.1 ATROPHY OF KIDNEY (TERMINAL): ICD-10-CM

## 2021-09-21 DIAGNOSIS — I48.91 UNSPECIFIED ATRIAL FIBRILLATION: ICD-10-CM

## 2021-09-21 DIAGNOSIS — R73.03 PREDIABETES.: ICD-10-CM

## 2021-09-21 DIAGNOSIS — R80.9 PROTEINURIA, UNSPECIFIED: ICD-10-CM

## 2021-09-21 DIAGNOSIS — I10 ESSENTIAL (PRIMARY) HYPERTENSION: ICD-10-CM

## 2021-09-21 PROCEDURE — 99214 OFFICE O/P EST MOD 30 MIN: CPT

## 2021-09-21 RX ORDER — POLYETHYLENE GLYCOL 3350, SODIUM SULFATE ANHYDROUS, SODIUM BICARBONATE, SODIUM CHLORIDE, POTASSIUM CHLORIDE 227.1; 21.5; 6.36; 5.53; .754 G/L; G/L; G/L; G/L; G/L
227.1 POWDER, FOR SOLUTION ORAL
Qty: 1 | Refills: 0 | Status: DISCONTINUED | COMMUNITY
Start: 2020-09-22 | End: 2021-09-21

## 2021-09-21 NOTE — PHYSICAL EXAM
[General Appearance - Alert] : alert [General Appearance - In No Acute Distress] : in no acute distress [Oriented To Time, Place, And Person] : oriented to person, place, and time [Impaired Insight] : insight and judgment were intact [Exaggerated Use Of Accessory Muscles For Inspiration] : no accessory muscle use [Auscultation Breath Sounds / Voice Sounds] : lungs were clear to auscultation bilaterally [Heart Sounds] : normal S1 and S2 [Edema] : there was no peripheral edema [Musculoskeletal - Swelling] : no joint swelling seen [] : no rash

## 2021-09-21 NOTE — HISTORY OF PRESENT ILLNESS
[FreeTextEntry1] : 65yo originally from [place] with Afib on AC, long standing HTN, prediabetes, CKD III here for f/u\par \par PCP Dr. Alisha Kauffman - \par Cardiologist: Dr Aliyah Lockhart\par \par Pfizer vaccinated x2. \par \par -140s/80s at home. \par Wt is stable. No dietary changes. Gets occasional headache for which he'll take tylenol. No SOB. \par Dizziness completely resolved since reducing chlorthalidone to 12.5mg daily\par He also says a dr told him to reduce rosuvastatin by 1/2 so I adjusted the med list\par No edema. No urinary straining. \par \par \par

## 2021-09-21 NOTE — ASSESSMENT
[FreeTextEntry1] : 65yo originally from [place] with Afib on AC, long standing HTN, prediabetes, CKD III here for f/u\par \par # CKD III\par -  baseline Cr between 4462-9629 steady at 1.6-1.7. [place] u/a. Likely 2/2 HTN. \par - 6/19 sono 8cm R mildly atrophic kidney and 11cm L with 7cm non septated simple cyst both with increased echogenicity consistent with CKD.\par - egfr 50s, if drops <40 advise decreasing xeralto to 15mg daily. \par - serologic GN w/u negative aside from evidence of past Hep B infx s/p core Ab production. \par \par # HTN and Afib- controlled, element of white coat HTN. We discussed how to check BP 3x/row at home to improve reliability.  \par \par # Pre diabetes - well controlled\par \par Plan\par - check renal function, u/a today\par - f/u in 8-10 months\par \par

## 2023-01-30 NOTE — REASON FOR VISIT
How Many Skin Cancers Have You Had?: more than one What Is The Reason For Today's Visit?: History of Non-Melanoma Skin Cancer When Was Your Last Cancer Diagnosed?: 21 [Follow-Up] : a follow-up visit

## 2023-02-28 ENCOUNTER — OUTPATIENT (OUTPATIENT)
Dept: OUTPATIENT SERVICES | Facility: HOSPITAL | Age: 68
LOS: 1 days | End: 2023-02-28

## 2023-02-28 DIAGNOSIS — R06.09 OTHER FORMS OF DYSPNEA: ICD-10-CM
